# Patient Record
Sex: FEMALE | Race: WHITE | NOT HISPANIC OR LATINO | URBAN - METROPOLITAN AREA
[De-identification: names, ages, dates, MRNs, and addresses within clinical notes are randomized per-mention and may not be internally consistent; named-entity substitution may affect disease eponyms.]

---

## 2023-05-15 ENCOUNTER — OFFICE (OUTPATIENT)
Dept: URBAN - METROPOLITAN AREA TELEHEALTH 12 | Facility: TELEHEALTH | Age: 68
End: 2023-05-15

## 2023-05-15 VITALS — WEIGHT: 222 LBS | HEIGHT: 67 IN

## 2023-05-15 DIAGNOSIS — K62.5 HEMORRHAGE OF ANUS AND RECTUM: ICD-10-CM

## 2023-05-15 DIAGNOSIS — K59.09 OTHER CONSTIPATION: ICD-10-CM

## 2023-05-15 PROCEDURE — 99204 OFFICE O/P NEW MOD 45 MIN: CPT | Mod: 95

## 2023-05-15 RX ORDER — HYDROCORTISONE ACETATE 25 MG/1
SUPPOSITORY RECTAL
Qty: 7 | Refills: 0 | Status: COMPLETED
Start: 2023-05-15 | End: 2023-12-05

## 2023-05-15 NOTE — SERVICEHPINOTES
PATIENT VERIFIED BY DATE OF BIRTH AND NAME. Patient has been consented for this telecommunication visit.69 y/o female presents for evaluation of worsening constipation and rectal bleeding. Patient reports long-standing issues with bowel movements. At baseline, BMs every 2-3 days, formed, sometimes hard. In January of this year, noticed she was prolonged periods of constipation. Became concerned as she developed "bright red blood" per rectum x 2 days. She notes having the urge to move bowels but was not producing stools "only blood would come out". She took 1 bottle of mag citrate and started prune juice qAM, this worked well until last week when sx recurred. PCP advised Miralax but she had an episode of incontinence therefore d/c'd. Her last "normal BM" was 6 days ago. Also had 1 day of mod bleeding, and now seeing brb on wipe when she passes gas and sometimes small bits of stool. No melena. No abdominal or rectal/anal pain at any point. She drinks 6-8 bottles of water/day. Has been working on increasing fiber but nothing seems to be helping. She is concerned about a "blockage". Her last CSY was in 1990s, done for constipation, reportedly normal aside from "long twisty colon".br Denies unintentional weight loss, fevers, chills, night sweats.félix

## 2023-05-15 NOTE — SERVICENOTES
Patient's visit was conducted through video telecommunication. Patient consented before the start of visit as to understanding of privacy concerns, possible technological failure, and their responsibility of carrying out instructions of plan.  Provider was located in their home during this visit. I have reviewed the history, physical exam, assessment and management plans.  I concur with or have edited all elements of her note.

## 2023-06-06 ENCOUNTER — OFFICE (OUTPATIENT)
Dept: URBAN - METROPOLITAN AREA CLINIC 79 | Facility: CLINIC | Age: 68
End: 2023-06-06

## 2023-06-06 PROCEDURE — 00031: CPT | Performed by: INTERNAL MEDICINE

## 2023-06-28 ENCOUNTER — ON CAMPUS - OUTPATIENT (OUTPATIENT)
Dept: URBAN - METROPOLITAN AREA HOSPITAL 65 | Facility: HOSPITAL | Age: 68
End: 2023-06-28
Payer: COMMERCIAL

## 2023-06-28 DIAGNOSIS — K63.5 POLYP OF COLON: ICD-10-CM

## 2023-06-28 DIAGNOSIS — K62.5 HEMORRHAGE OF ANUS AND RECTUM: ICD-10-CM

## 2023-06-28 DIAGNOSIS — D12.2 BENIGN NEOPLASM OF ASCENDING COLON: ICD-10-CM

## 2023-06-28 DIAGNOSIS — C18.7 MALIGNANT NEOPLASM OF SIGMOID COLON: ICD-10-CM

## 2023-06-28 PROCEDURE — 45385 COLONOSCOPY W/LESION REMOVAL: CPT | Performed by: INTERNAL MEDICINE

## 2023-06-28 PROCEDURE — 45381 COLONOSCOPY SUBMUCOUS NJX: CPT | Mod: 59 | Performed by: INTERNAL MEDICINE

## 2023-12-05 ENCOUNTER — TELEHEALTH PROVIDED OTHER THAN IN PATIENT'S HOME (OUTPATIENT)
Dept: URBAN - METROPOLITAN AREA TELEHEALTH 12 | Facility: TELEHEALTH | Age: 68
End: 2023-12-05

## 2023-12-05 VITALS — HEIGHT: 67 IN | WEIGHT: 201 LBS

## 2023-12-05 DIAGNOSIS — K59.09 OTHER CONSTIPATION: ICD-10-CM

## 2023-12-05 DIAGNOSIS — Z85.038 PERSONAL HISTORY OF OTHER MALIGNANT NEOPLASM OF LARGE INTEST: ICD-10-CM

## 2023-12-05 PROCEDURE — 99214 OFFICE O/P EST MOD 30 MIN: CPT | Mod: 95 | Performed by: INTERNAL MEDICINE

## 2023-12-05 NOTE — SERVICEHPINOTES
PATIENT VERIFIED BY DATE OF BIRTH AND NAME. Patient has been consented for this telecommunication visit. span style="background-color: rgb(255, 255, 0)" visited="true"Caroline seen in followup. She has had a successful surgical resection of the polyp area in August. She has had persistent constipation. despite having surgical resection of a segment of the colon. Was prescribed Linzess and prune juice which seems to have helped on most days, Miralax needed 2 times per month on average . Most days the stool is soft but has difficulty evacuating from the rectum. Previously tried stimulant laxative suppository with miralax which caused abdominal pain, but has not tried stimulant laxatives by itself as a regimen or rescue. Also needs a surveillance colonoscopy.  /span  [ROS Wording]

## 2023-12-05 NOTE — SERVICENOTES
Patient's visit was conducted through [employer] video telecommunication. Patient consented before the start of visit as to understanding of privacy concerns, possible technological failure, and their responsibility of carrying out instructions of plan.

## 2024-07-25 ENCOUNTER — TELEHEALTH PROVIDED OTHER THAN IN PATIENT'S HOME (OUTPATIENT)
Dept: URBAN - METROPOLITAN AREA TELEHEALTH 7 | Facility: TELEHEALTH | Age: 69
End: 2024-07-25

## 2024-07-25 VITALS — HEIGHT: 67 IN | WEIGHT: 198 LBS

## 2024-07-25 DIAGNOSIS — Z85.038 PERSONAL HISTORY OF OTHER MALIGNANT NEOPLASM OF LARGE INTEST: ICD-10-CM

## 2024-07-25 DIAGNOSIS — K59.09 OTHER CONSTIPATION: ICD-10-CM

## 2024-07-25 PROCEDURE — 99214 OFFICE O/P EST MOD 30 MIN: CPT | Mod: 95 | Performed by: PHYSICIAN ASSISTANT

## 2024-07-25 NOTE — SERVICENOTES
Patient was located in their home during visit., Patient's visit was conducted through [employer] video telecommunication. Patient consented before the start of visit as to understanding of privacy concerns, possible technological failure, and their responsibility of carrying out instructions of plan., I spent 30 minutes today reviewing the chart, talking with the patient, reviewing previous results with patient, discussing plan, and documenting. Patient understands and agrees with plan. Questions/concerns addressed.
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
## 2025-02-12 ENCOUNTER — TELEHEALTH PROVIDED IN PATIENT'S HOME (OUTPATIENT)
Dept: URBAN - METROPOLITAN AREA TELEHEALTH 12 | Facility: TELEHEALTH | Age: 70
End: 2025-02-12
Payer: COMMERCIAL

## 2025-02-12 VITALS — WEIGHT: 220 LBS | HEIGHT: 68 IN

## 2025-02-12 DIAGNOSIS — K59.09 OTHER CONSTIPATION: ICD-10-CM

## 2025-02-12 DIAGNOSIS — Z85.038 PERSONAL HISTORY OF OTHER MALIGNANT NEOPLASM OF LARGE INTEST: ICD-10-CM

## 2025-02-12 PROCEDURE — 99214 OFFICE O/P EST MOD 30 MIN: CPT | Mod: 95 | Performed by: NURSE PRACTITIONER

## 2025-02-12 NOTE — SERVICENOTES
Patient was located in their home during visit., Patient's visit was conducted through [employer] video telecommunication. Patient consented before the start of visit as to understanding of privacy concerns, possible technological failure, and their responsibility of carrying out instructions of plan., I spent 20 minutes today reviewing the chart, talking with the patient, reviewing previous results with patient, discussing plan, and documenting. Patient understands and agrees with plan. Questions/concerns addressed.

## 2025-02-12 NOTE — SERVICEHPINOTES
PATIENT VERIFIED BY DATE OF BIRTH AND NAME. Patient has been consented for this telecommunication visit using [employer] application. 68 yo female presents for f/u colon cancer. She had a colonoscopy in June of 2023 with sigmoid colon cancer within a large polyp s/p surgical resection in August of 2023. No chemo was required.
br The colonoscopy was scheduled in Oct 2024 but was cancelled due to IVF shortage. 
brShe was taking Linzess and she has been doing well with it. However, the insurance did not cover this. Changed the insurance but the copay is too expensive, $400. BMs are okay. Takes dulcolax prn. Miralax does not work well, causes severe cramping. 
br Denies blood in stool, melena, diarrhea, lower abdominal pain or weight loss.
br
Denies n/v, dysphagia or epigastric pain. Occasional heartburn and take pepcid prn.  brDenies family hx of colon cancer.   
br
br Hx of heart murmur, mild sees Dr. Brown. 2 year follow up was recommended per pt. br visited="true"   All 10 point review of systems have been reviewed as per HPI and otherwise negative.